# Patient Record
Sex: MALE | Race: WHITE | Employment: FULL TIME | URBAN - METROPOLITAN AREA
[De-identification: names, ages, dates, MRNs, and addresses within clinical notes are randomized per-mention and may not be internally consistent; named-entity substitution may affect disease eponyms.]

---

## 2017-03-30 ENCOUNTER — HOSPITAL ENCOUNTER (EMERGENCY)
Age: 27
Discharge: HOME | End: 2017-03-30
Attending: EMERGENCY MEDICINE | Admitting: EMERGENCY MEDICINE
Payer: MEDICAID

## 2017-03-30 ENCOUNTER — APPOINTMENT (OUTPATIENT)
Dept: GENERAL RADIOLOGY | Age: 27
End: 2017-03-30
Attending: EMERGENCY MEDICINE
Payer: MEDICAID

## 2017-03-30 VITALS
RESPIRATION RATE: 15 BRPM | HEART RATE: 97 BPM | OXYGEN SATURATION: 95 % | HEIGHT: 68 IN | DIASTOLIC BLOOD PRESSURE: 65 MMHG | TEMPERATURE: 98.1 F | WEIGHT: 160 LBS | BODY MASS INDEX: 24.25 KG/M2 | SYSTOLIC BLOOD PRESSURE: 109 MMHG

## 2017-03-30 DIAGNOSIS — T43.611A: ICD-10-CM

## 2017-03-30 DIAGNOSIS — R00.0 TACHYCARDIA: ICD-10-CM

## 2017-03-30 DIAGNOSIS — R00.2 PALPITATIONS: Primary | ICD-10-CM

## 2017-03-30 LAB
ALBUMIN SERPL BCP-MCNC: 4.4 G/DL (ref 3.4–5)
ALBUMIN/GLOB SERPL: 1.2 {RATIO} (ref 0.8–1.7)
ALP SERPL-CCNC: 71 U/L (ref 45–117)
ALT SERPL-CCNC: 57 U/L (ref 16–61)
ANION GAP BLD CALC-SCNC: 12 MMOL/L (ref 3–18)
AST SERPL W P-5'-P-CCNC: 30 U/L (ref 15–37)
BILIRUB DIRECT SERPL-MCNC: 0.1 MG/DL (ref 0–0.2)
BILIRUB SERPL-MCNC: 0.3 MG/DL (ref 0.2–1)
BUN SERPL-MCNC: 13 MG/DL (ref 7–18)
BUN/CREAT SERPL: 14 (ref 12–20)
CALCIUM SERPL-MCNC: 9.3 MG/DL (ref 8.5–10.1)
CHLORIDE SERPL-SCNC: 102 MMOL/L (ref 100–108)
CO2 SERPL-SCNC: 28 MMOL/L (ref 21–32)
CREAT SERPL-MCNC: 0.95 MG/DL (ref 0.6–1.3)
D DIMER PPP FEU-MCNC: <0.27 UG/ML(FEU)
GLOBULIN SER CALC-MCNC: 3.7 G/DL (ref 2–4)
GLUCOSE SERPL-MCNC: 128 MG/DL (ref 74–99)
MAGNESIUM SERPL-MCNC: 1.9 MG/DL (ref 1.8–2.4)
POTASSIUM SERPL-SCNC: 3.6 MMOL/L (ref 3.5–5.5)
PROT SERPL-MCNC: 8.1 G/DL (ref 6.4–8.2)
SODIUM SERPL-SCNC: 142 MMOL/L (ref 136–145)

## 2017-03-30 PROCEDURE — 99285 EMERGENCY DEPT VISIT HI MDM: CPT

## 2017-03-30 PROCEDURE — 85379 FIBRIN DEGRADATION QUANT: CPT | Performed by: EMERGENCY MEDICINE

## 2017-03-30 PROCEDURE — 96360 HYDRATION IV INFUSION INIT: CPT

## 2017-03-30 PROCEDURE — 80048 BASIC METABOLIC PNL TOTAL CA: CPT | Performed by: EMERGENCY MEDICINE

## 2017-03-30 PROCEDURE — 74011250636 HC RX REV CODE- 250/636: Performed by: EMERGENCY MEDICINE

## 2017-03-30 PROCEDURE — 96361 HYDRATE IV INFUSION ADD-ON: CPT

## 2017-03-30 PROCEDURE — 93005 ELECTROCARDIOGRAM TRACING: CPT

## 2017-03-30 PROCEDURE — 74011250637 HC RX REV CODE- 250/637: Performed by: EMERGENCY MEDICINE

## 2017-03-30 PROCEDURE — 83735 ASSAY OF MAGNESIUM: CPT | Performed by: EMERGENCY MEDICINE

## 2017-03-30 PROCEDURE — 71010 XR CHEST PORT: CPT

## 2017-03-30 PROCEDURE — 80076 HEPATIC FUNCTION PANEL: CPT | Performed by: EMERGENCY MEDICINE

## 2017-03-30 RX ORDER — LORAZEPAM 1 MG/1
1 TABLET ORAL
Status: COMPLETED | OUTPATIENT
Start: 2017-03-30 | End: 2017-03-30

## 2017-03-30 RX ADMIN — SODIUM CHLORIDE 1000 ML: 900 INJECTION, SOLUTION INTRAVENOUS at 18:43

## 2017-03-30 RX ADMIN — LORAZEPAM 1 MG: 1 TABLET ORAL at 17:43

## 2017-03-30 RX ADMIN — SODIUM CHLORIDE 1000 ML: 900 INJECTION, SOLUTION INTRAVENOUS at 17:38

## 2017-03-30 NOTE — ED NOTES
Rounding note- Father at bedside with patient. Patient sleeping. VSS. Call bell and urinal within reach.

## 2017-03-30 NOTE — DISCHARGE INSTRUCTIONS
Palpitations: Care Instructions  Your Care Instructions    Heart palpitations are the uncomfortable sensation that your heart is beating fast or irregularly. You might feel pounding or fluttering in your chest. It might feel like your heart is skipping a beat. Although palpitations may be caused by a heart problem, they also occur because of stress, fatigue, or use of alcohol, caffeine, or nicotine. Many medicines, including diet pills, antihistamines, decongestants, and some herbal products, can cause heart palpitations. Nearly everyone has palpitations from time to time. Depending on your symptoms, your doctor may need to do more tests to try to find the cause of your palpitations. Follow-up care is a key part of your treatment and safety. Be sure to make and go to all appointments, and call your doctor if you are having problems. It's also a good idea to know your test results and keep a list of the medicines you take. How can you care for yourself at home? · Avoid caffeine, nicotine, and excess alcohol. · Do not take illegal drugs, such as methamphetamines and cocaine. · Do not take weight loss or diet medicines unless you talk with your doctor first.  · Get plenty of sleep. · Do not overeat. · If you have palpitations again, take deep breaths and try to relax. This may slow a racing heart. · If you start to feel lightheaded, lie down to avoid injuries that might result if you pass out and fall down. · Keep a record of your palpitations and bring it to your next doctor's appointment. Write down:  ¨ The date and time. ¨ Your pulse. (If your heart is beating fast, it may be hard to count your pulse.)  ¨ What you were doing when the palpitations started. ¨ How long the palpitations lasted. ¨ Any other symptoms. · If an activity causes palpitations, slow down or stop. Talk to your doctor before you do that activity again. · Take your medicines exactly as prescribed.  Call your doctor if you think you are having a problem with your medicine. When should you call for help? Call 911 anytime you think you may need emergency care. For example, call if:  · You passed out (lost consciousness). · You have symptoms of a heart attack. These may include:  ¨ Chest pain or pressure, or a strange feeling in the chest.  ¨ Sweating. ¨ Shortness of breath. ¨ Pain, pressure, or a strange feeling in the back, neck, jaw, or upper belly or in one or both shoulders or arms. ¨ Lightheadedness or sudden weakness. ¨ A fast or irregular heartbeat. After you call 911, the  may tell you to chew 1 adult-strength or 2 to 4 low-dose aspirin. Wait for an ambulance. Do not try to drive yourself. · You have symptoms of a stroke. These may include:  ¨ Sudden numbness, tingling, weakness, or loss of movement in your face, arm, or leg, especially on only one side of your body. ¨ Sudden vision changes. ¨ Sudden trouble speaking. ¨ Sudden confusion or trouble understanding simple statements. ¨ Sudden problems with walking or balance. ¨ A sudden, severe headache that is different from past headaches. Call your doctor now or seek immediate medical care if:  · You have heart palpitations and:  ¨ Are dizzy or lightheaded, or you feel like you may faint. ¨ Have new or increased shortness of breath. Watch closely for changes in your health, and be sure to contact your doctor if:  · You continue to have heart palpitations. Where can you learn more? Go to http://vaughn-rajwinder.info/. Enter R508 in the search box to learn more about \"Palpitations: Care Instructions. \"  Current as of: January 27, 2016  Content Version: 11.2  © 2158-4334 [employer]. Care instructions adapted under license by [employer] (which disclaims liability or warranty for this information).  If you have questions about a medical condition or this instruction, always ask your healthcare professional. [employer] disclaims any warranty or liability for your use of this information.

## 2017-03-30 NOTE — ED NOTES
Discharge instructions given to pt. pt verbalized understanding discharge instructions. Patient left emergency department by foot  With father, in good condition. 0 Prescriptions given. Armband removed and shredded.

## 2017-03-30 NOTE — ED PROVIDER NOTES
[facility]  EMERGENCY DEPARTMENT HISTORY AND PHYSICAL EXAM       Date: 3/30/2017   Patient Name: Stacey Robert   YOB: 1990  Medical Record Number: [id number]    History of Presenting Illness     Chief Complaint   Patient presents with    Panic Attack        History Provided By:  patient    Additional History:   4:42 PM   Stacey Robert is a 32 y.o. male with hx of anxiety attacks who presents to the emergency department via EMS c/o palpitations PTA. Pt reports he has not been sleeping well since he is visiting family on a trip and took coffee, 5 hour energy, Monster energy drink, and a stimulating herbal stimulant throughout the day. Other sxs include intermittent chest pain which is not currently present. [place] is from [place] and recently drove here (a 10 hour drive). Denies other medical history. No association with exertion. No headache. No SI, no HI. No hx of thyroid d/o or weight changes. Denies abdominal pain, SOB, leg swelling, and any other sxs or complaints. Primary Care Provider: Abel Dawn, MD   Specialist:    Past History     Past Medical History:   Past Medical History:   Diagnosis Date    Psychiatric disorder         Past Surgical History:   No past surgical history on file. Family History:   No family history on file. Social History:   Social History   Substance Use Topics    Smoking status: Former Smoker    Smokeless tobacco: None    Alcohol use Yes      Comment: rarely        Allergies: Allergies   Allergen Reactions    Hydromorphone Anxiety        Review of Systems   Review of Systems   Cardiovascular: Positive for chest pain (intermittent) and palpitations. Negative for leg swelling. Gastrointestinal: Negative for abdominal pain. Psychiatric/Behavioral: Positive for sleep disturbance. All other systems reviewed and are negative.     Physical Exam  Vitals:    03/30/17 1830 03/30/17 1845 03/30/17 1846 03/30/17 1850   BP: 123/53  117/70 Pulse: 88 91 89    Resp: 15 18 18    Temp:   98.1 °F (36.7 °C)    SpO2: 100% 100% 100% 98%   Weight:       Height:           Physical Exam   Nursing note and vitals reviewed. Vital signs and nursing notes reviewed    CONSTITUTIONAL: Alert, in no apparent distress; well-developed; well-nourished. HEAD:  Normocephalic, atraumatic  EYES: PERRL; EOM's intact. ENTM: Nose: no rhinorrhea; Throat: no erythema or exudate, mucous membranes moist  Neck:  No JVD, supple without lymphadenopathy  RESP: Chest clear, equal breath sounds. CV: Regular tachycardia with a rate of 120 bpm; No murmurs, gallops or rubs. GI: Normal bowel sounds, abdomen soft and non-tender. No masses or organomegaly. : No costo-vertebral angle tenderness. BACK:  Non-tender  UPPER EXT:  Normal inspection  LOWER EXT: No edema, no calf tenderness. Distal pulses intact. NEURO: CN intact, reflexes 2/4 and sym, strength 5/5 and sym, sensation intact. SKIN: No rashes; Normal for age and stage. PSYCH:  Alert and oriented, normal affect. Diagnostic Study Results     Labs -      Recent Results (from the past 12 hour(s))   EKG, 12 LEAD, INITIAL    Collection Time: 03/30/17  4:55 PM   Result Value Ref Range    Ventricular Rate 146 BPM    Atrial Rate 146 BPM    P-R Interval 92 ms    QRS Duration 78 ms    Q-T Interval 320 ms    QTC Calculation (Bezet) 498 ms    Calculated P Axis 60 degrees    Calculated R Axis 56 degrees    Calculated T Axis 59 degrees    Diagnosis       Sinus tachycardia with short MS  Possible Left atrial enlargement  Borderline ECG  No previous ECGs available     HEPATIC FUNCTION PANEL    Collection Time: 03/30/17  5:25 PM   Result Value Ref Range    Protein, total 8.1 6.4 - 8.2 g/dL    Albumin 4.4 3.4 - 5.0 g/dL    Globulin 3.7 2.0 - 4.0 g/dL    A-G Ratio 1.2 0.8 - 1.7      Bilirubin, total 0.3 0.2 - 1.0 MG/DL    Bilirubin, direct 0.1 0.0 - 0.2 MG/DL    Alk.  phosphatase 71 45 - 117 U/L    AST (SGOT) 30 15 - 37 U/L    ALT (SGPT) 57 16 - 61 U/L   MAGNESIUM    Collection Time: 03/30/17  5:25 PM   Result Value Ref Range    Magnesium 1.9 1.8 - 2.4 mg/dL   METABOLIC PANEL, BASIC    Collection Time: 03/30/17  5:25 PM   Result Value Ref Range    Sodium 142 136 - 145 mmol/L    Potassium 3.6 3.5 - 5.5 mmol/L    Chloride 102 100 - 108 mmol/L    CO2 28 21 - 32 mmol/L    Anion gap 12 3.0 - 18 mmol/L    Glucose 128 (H) 74 - 99 mg/dL    BUN 13 7.0 - 18 MG/DL    Creatinine 0.95 0.6 - 1.3 MG/DL    BUN/Creatinine ratio 14 12 - 20      GFR est AA >60 >60 ml/min/1.73m2    GFR est non-AA >60 >60 ml/min/1.73m2    Calcium 9.3 8.5 - 10.1 MG/DL   D DIMER    Collection Time: 03/30/17  5:25 PM   Result Value Ref Range    D DIMER <0.27 <0.46 ug/ml(FEU)       Radiologic Studies -  6:59 PM  RADIOLOGY FINDINGS  Chest X-ray shows no acute process  Pending review by Radiologist  Recorded by EDUARDO Mack, as dictated by Pollo Ya MD      XR CHEST PORT    (Results Pending)        Medical Decision Making   I am the first provider for this patient. I reviewed the vital signs, available nursing notes, past medical history, past surgical history, family history and social history. Vital Signs-Reviewed the patient's vital signs. Patient Vitals for the past 12 hrs:   Temp Pulse Resp BP SpO2   03/30/17 1850 - - - - 98 %   03/30/17 1846 98.1 °F (36.7 °C) 89 18 117/70 100 %   03/30/17 1845 - 91 18 - 100 %   03/30/17 1830 - 88 15 123/53 100 %   03/30/17 1815 - 94 20 - 100 %   03/30/17 1800 - 95 14 133/84 100 %   03/30/17 1745 - (!) 117 19 - 100 %   03/30/17 1730 - (!) 123 10 135/84 100 %   03/30/17 1715 - (!) 133 14 - 100 %   03/30/17 1713 - (!) 135 12 (!) 148/95 100 %   03/30/17 1700 - (!) 128 13 (!) 140/97 100 %   03/30/17 1645 - 99 22 (!) 152/91 99 %       Pulse Oximetry Analysis - Normal 98% on room air     Cardiac Monitor:   Rate: 102 bpm  Rhythm: Sinus tachycardia. EKG interpretation: (Preliminary)  Sinus tachycardia. Rate 146 bpm. Short CO.  No delta wave. EKG read by Charisma Hagan MD at 4:55 PM     Old Medical Records: Nursing notes. Provider Notes:   INITIAL CLINICAL IMPRESSION and PLANS:  The patient presents with the primary complaint(s) of: palpitations. The presentation, to include historical aspects and clinical findings are consistent with the DX of caffeine overdose. However, other possible DX's to consider as primary, associated with, or exacerbated by include:    1. Thyroid dysfunction  2. PE  3. New arrhythmia  4. Electrolyte abnormality    Considering the above, my initial management plan to evaluate and therapeutic interventions include the following and as noted in the orders:    1. Labs: POC troponin, POC CHEM 8, D-dimer, BMP, Magnesium, Hepatic function panel  2. Imaging: EKG, Chest X-ray    ED Course:    4:42 PM   Initial assessment performed. 6:14 PM Pt has been re-examined by Luis Williamson MD. Patient is well appearing. Updated the father and brother who are at bedside. 7:00 PM Patient is feeling better. Heart rate is now down to 80 bpm. Discussed outpatient follow up, the patient is comfortable with this plan. Neg dimer. HR improved with ativan. No ACS symptoms. Most likely caffeine overdose from high amount of caffeine taken throughout day. Marianne Cocker appearing. Monitor in ED. No dysrythmia on EKG/cardiac monitor. F[facility]u with [facility] testing. No signs of thyroid toxicity. Medications Given in the ED:  Medications   sodium chloride 0.9 % bolus infusion 1,000 mL (1,000 mL IntraVENous New Bag 3/30/17 1843)   sodium chloride 0.9 % bolus infusion 1,000 mL (0 mL IntraVENous IV Completed 3/30/17 1840)   LORazepam (ATIVAN) tablet 1 mg (1 mg Oral Given 3/30/17 0493)       Discharge Note:  7:00 PM   Pt has been reexamined. Patient has no new complaints, changes, or physical findings. Care plan outlined and precautions discussed. Results were reviewed with the patient.  All medications were reviewed with the patient; will d/c home. All of pt's questions and concerns were addressed. Patient was instructed and agrees to follow up with his PCP, as well as to return to the ED upon further deterioration. Patient is ready to go home. Diagnosis   Clinical Impression:   1. Palpitations     2. Chest pain  3. Caffeine overdose    Follow-up Information     Follow up With Details Comments Contact Info    Your primary care provider Schedule an appointment as soon as possible for a visit in 2 days For thyroid check follow up     [facility] EMERGENCY DEPT  As needed, If symptoms worsen [address] [place]h Mora  [phone]          Current Discharge Medication List          _______________________________   Attestations: This note is prepared by Theodora Johnson, acting as a Scribe for Merrick Hurst MD on 4:41 PM on 3/30/2017 . Merrick Hurst MD : The scribe's documentation has been prepared under my direction and personally reviewed by me in its entirety.   _______________________________

## 2017-03-30 NOTE — ED TRIAGE NOTES
Patient was at a [employer] watching a film and suddenly felt \"feeling of doom, heart started racing, then felt faint\". [facility] responded to 911 call and transported him to ED. They report sinus tach in 130's on monitor, /90 and 97% o2 on room air. No syncope reported by patient or EMS during transport. Patient reports he has consumed coffee, a \"5hr energy shot\", an energy drink, and takes a daily herbal supplement (Scifiniti) for energy. States he has a history of anxiety and panic and took 0.5mg of his mother's klonopin today as well. Sepsis Screening completed    (  )Patient meets SIRS criteria. ( x)Patient does not meet SIRS criteria.       SIRS Criteria is achieved when two or more of the following are present   Temperature < 96.8°F (36°C) or > 100.9°F (38.3°C)   Heart Rate > 90 beats per minute   Respiratory Rate > 20 breaths per minute   WBC count > 12,000 or <4,000 or > 10% bands

## 2017-03-30 NOTE — ED NOTES
Bedside shift change report given to Rosey Hill RN (oncoming nurse) by [facility]. Stevan Wilcox RN (offgoing nurse). Report included the following information SBAR, ED Summary, Procedure Summary, MAR, Recent Results, Med Rec Status and Cardiac Rhythm NSR.

## 2017-04-02 LAB
ATRIAL RATE: 146 BPM
CALCULATED P AXIS, ECG09: 60 DEGREES
CALCULATED R AXIS, ECG10: 56 DEGREES
CALCULATED T AXIS, ECG11: 59 DEGREES
DIAGNOSIS, 93000: NORMAL
P-R INTERVAL, ECG05: 92 MS
Q-T INTERVAL, ECG07: 320 MS
QRS DURATION, ECG06: 78 MS
QTC CALCULATION (BEZET), ECG08: 498 MS
VENTRICULAR RATE, ECG03: 146 BPM